# Patient Record
Sex: MALE | Race: WHITE | NOT HISPANIC OR LATINO | Employment: FULL TIME | URBAN - METROPOLITAN AREA
[De-identification: names, ages, dates, MRNs, and addresses within clinical notes are randomized per-mention and may not be internally consistent; named-entity substitution may affect disease eponyms.]

---

## 2018-03-24 ENCOUNTER — HISTORICAL (OUTPATIENT)
Dept: ADMINISTRATIVE | Facility: HOSPITAL | Age: 33
End: 2018-03-24

## 2023-01-30 ENCOUNTER — HOSPITAL ENCOUNTER (EMERGENCY)
Facility: HOSPITAL | Age: 38
Discharge: HOME | End: 2023-01-30
Attending: EMERGENCY MEDICINE
Payer: OTHER MISCELLANEOUS

## 2023-01-30 VITALS
RESPIRATION RATE: 18 BRPM | HEART RATE: 60 BPM | WEIGHT: 232 LBS | DIASTOLIC BLOOD PRESSURE: 57 MMHG | TEMPERATURE: 98 F | SYSTOLIC BLOOD PRESSURE: 119 MMHG | BODY MASS INDEX: 32.48 KG/M2 | OXYGEN SATURATION: 96 % | HEIGHT: 71 IN

## 2023-01-30 DIAGNOSIS — M25.519 SHOULDER PAIN: ICD-10-CM

## 2023-01-30 DIAGNOSIS — S46.011A TRAUMATIC TEAR OF RIGHT ROTATOR CUFF, UNSPECIFIED TEAR EXTENT, INITIAL ENCOUNTER: Primary | ICD-10-CM

## 2023-01-30 PROCEDURE — 63600175 PHARM REV CODE 636 W HCPCS: Performed by: NURSE PRACTITIONER

## 2023-01-30 PROCEDURE — 99284 EMERGENCY DEPT VISIT MOD MDM: CPT

## 2023-01-30 PROCEDURE — 96372 THER/PROPH/DIAG INJ SC/IM: CPT | Performed by: NURSE PRACTITIONER

## 2023-01-30 RX ORDER — KETOROLAC TROMETHAMINE 30 MG/ML
30 INJECTION, SOLUTION INTRAMUSCULAR; INTRAVENOUS
Status: COMPLETED | OUTPATIENT
Start: 2023-01-30 | End: 2023-01-30

## 2023-01-30 RX ORDER — DIAZEPAM 10 MG/2ML
5 INJECTION INTRAMUSCULAR
Status: COMPLETED | OUTPATIENT
Start: 2023-01-30 | End: 2023-01-30

## 2023-01-30 RX ORDER — NAPROXEN 500 MG/1
500 TABLET ORAL 2 TIMES DAILY WITH MEALS
Qty: 15 TABLET | Refills: 0 | Status: SHIPPED | OUTPATIENT
Start: 2023-01-30

## 2023-01-30 RX ADMIN — KETOROLAC TROMETHAMINE 30 MG: 30 INJECTION, SOLUTION INTRAMUSCULAR; INTRAVENOUS at 12:01

## 2023-01-30 RX ADMIN — DIAZEPAM 5 MG: 5 INJECTION, SOLUTION INTRAMUSCULAR; INTRAVENOUS at 12:01

## 2023-01-30 NOTE — DISCHARGE INSTRUCTIONS
RETURN TO EMERGENCY DEPARTMENT WITHOUT FAIL, IF YOUR SYMPTOMS WORSEN, IF YOU GET NEW OR DIFFERENT SYMPTOMS, IF YOU ARE UNABLE TO FOLLOW UP AS DIRECTED, OR IF YOU HAVE ANY CONCERNS OR WORRIES.

## 2023-01-30 NOTE — ED NOTES
"Pt in room. ALERT AND ORIENTED.  REPORTS WRENCHING INJURY AT R SHOULDER. SWELLING NOTED. <3" CAPILLARY REFILL X 4 EXTREMITIES. NO RD. JEWELL BUT DECREASED AND PAINFUL ROM AT RA.  ABLE TO MOVE FINGERS. NON DISTENDED ABDOMEN. FALLS RISK. CALL LIGHT IN REACH. ARM PROPPED FOR COMFORT.  ALERT AND ORIENTED.   "

## 2023-01-30 NOTE — FIRST PROVIDER EVALUATION
"Medical screening examination initiated.  I have conducted a focused provider triage encounter, findings are as follows:    Brief history of present illness:  Patient is a 28-year-old male presenting to ED for right shoulder pain.  Pt states his arm was thrown up and back by a malfunction with a high pressure hose.    Vitals:    01/30/23 1127   BP: (!) 146/76   Pulse: 70   Resp: 18   Temp: 98 °F (36.7 °C)   TempSrc: Oral   SpO2: 96%   Weight: 105.2 kg (232 lb)   Height: 5' 11" (1.803 m)       Pertinent physical exam:  VSS. Distressed due to pain.  Decreased ROM due to pain.     Brief workup plan:  imaging, medicate    Preliminary workup initiated; this workup will be continued and followed by the physician or advanced practice provider that is assigned to the patient when roomed.  "

## 2023-01-30 NOTE — ED PROVIDER NOTES
"Encounter Date: 1/30/2023       History     Chief Complaint   Patient presents with    Shoulder Injury     "I feel like my shoulder is out of place." R shoulder x1 hr. Pts arm was thrown up and back by a malfunction with a high pressure hose.     37-year-old male with past medical history of high blood pressure presents emergency department with right shoulder injury.  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  He was holding it with his right arm and his right arm got pushed backward and he heard 2 loud pops.  He said that he felt like his arm slipped out of place but then when he was trying to get out of the pool he put it back in and he said that he felt like it might a slip back out again but he is not sure.  He says he has pain in his right shoulder.  Pain with movement pain with range of motion.  Better at rest.  Has not taken anything for the pain prior to arrival.  This happened about an hour ago.    Review of patient's allergies indicates:  No Known Allergies  No past medical history on file.  No past surgical history on file.  No family history on file.     Review of Systems   Constitutional:  Negative for chills and fever.   HENT:  Negative for congestion and sore throat.    Respiratory:  Negative for shortness of breath.    Cardiovascular:  Negative for chest pain and palpitations.   Gastrointestinal:  Negative for abdominal pain, nausea and vomiting.   Genitourinary:  Negative for dysuria.   Musculoskeletal:  Positive for arthralgias. Negative for back pain.   Skin:  Negative for rash.   Neurological:  Negative for seizures, weakness and headaches.   Hematological:  Does not bruise/bleed easily.   All other systems reviewed and are negative.    Physical Exam     Initial Vitals [01/30/23 1127]   BP Pulse Resp Temp SpO2   (!) 146/76 70 18 98 °F (36.7 °C) 96 %      MAP       --         Physical Exam    Nursing note and vitals " reviewed.  Constitutional: He appears well-developed and well-nourished. He is not diaphoretic. No distress.   HENT:   Head: Normocephalic and atraumatic.   Mouth/Throat: Oropharynx is clear and moist. No oropharyngeal exudate.   Eyes: Conjunctivae and EOM are normal. Pupils are equal, round, and reactive to light.   Neck: Neck supple. No tracheal deviation present.   Normal range of motion.  Cardiovascular:  Normal rate, regular rhythm, normal heart sounds and intact distal pulses.           No murmur heard.  Pulmonary/Chest: Breath sounds normal. No stridor. No respiratory distress. He has no wheezes. He has no rales.   Abdominal: Abdomen is soft. Bowel sounds are normal. He exhibits no distension. There is no abdominal tenderness. There is no rebound and no guarding.   Musculoskeletal:         General: No tenderness or edema. Normal range of motion.      Cervical back: Normal range of motion and neck supple.      Comments: Right shoulder:  There is decreased range of motion secondary to pain.  There is no obvious palpable deformity.  There is a 2+ pulse present in the right radial artery.  There is no tenderness to the scapular region.  Full range of motion at the elbow without tenderness.     Neurological: He is alert and oriented to person, place, and time. He has normal strength. No cranial nerve deficit or sensory deficit.   Skin: Skin is warm and dry. Capillary refill takes less than 2 seconds. No rash noted. No erythema. No pallor.   Psychiatric: He has a normal mood and affect. His behavior is normal. Thought content normal.       ED Course   Procedures  Labs Reviewed - No data to display       Imaging Results              X-Ray Shoulder Trauma Right (Final result)  Result time 01/30/23 12:34:00      Final result by Talon Gonzalez MD (01/30/23 12:34:00)                   Narrative:    Reason: Shoulder pain. Possible dislocation.    FINDINGS:    Three views of the right shoulder show no fracture,  dislocation, or destructive osseous lesion. There is mild osteophytosis of the inferior glenoid. Mild degenerative changes of the AC joint.    IMPRESSION:    Mild degenerative changes of the right shoulder with no acute osseous abnormality.    Electronically signed by:  Talon Lisa DA SILVA  1/30/2023 12:34 PM [facility] Workstation: [id number]                                     Medications   diazePAM injection 5 mg (5 mg Intramuscular Given 1/30/23 2473)   ketorolac injection 30 mg (30 mg Intramuscular Given 1/30/23 1226)     Medical Decision Making:   Clinical Tests:   Radiological Study: Ordered and Reviewed  ED Management:  37-year-old male presents emergency department right shoulder injury.  I do not see any evidence of any dislocation or fracture.  He has some DJD shoulder.  He has been placed in a sling.  Pain is improved after Toradol.  He likely has a rotator cuff tear.  I do not see any other injuries on history or physical exam.  Recommend follow-up with Orthopedics sling use and return if symptoms change or worsen.  He is neurovascularly intact distally.      I had a detailed discussion with the patient and/or guardian regarding: The historical points, exam findings, and diagnostic results supporting the discharge diagnosis, lab results, pertinent radiology results, and the need for outpatient follow-up, for definitive care with a family practitioner and to return to the emergency department if symptoms worsen or persist or if there are any questions or concerns that arise at home. All questions have been answered in detail. Strict return to Emergency Department precautions have been provided.    A dictation software program was used for this note.  Please expect some simple typographical  errors in this note.                         Clinical Impression:   Final diagnoses:  [M25.519] Shoulder pain  [S46.011A] Traumatic tear of right rotator cuff, unspecified tear extent, initial encounter (Primary)        ED  Disposition Condition    Discharge Stable          ED Prescriptions       Medication Sig Dispense Start Date End Date Auth. Provider    naproxen (NAPROSYN) 500 MG tablet Take 1 tablet (500 mg total) by mouth 2 (two) times daily with meals. 15 tablet 1/30/2023 -- Kane Vora DO          Follow-up Information       Follow up With Specialties Details Why Contact Info Additional Information    [facility] - Emergency Dept Emergency Medicine  If symptoms worsen [address] [facility] [id number] [phone] 1st floor    Hector Almaraz MD Orthopedic Surgery In 3 days  [address] [place] [address]  [phone]                Kane Vora DO  01/30/23 1241

## 2023-01-30 NOTE — ED NOTES
Sling and swathe in place at R shoulder.  Alert and oriented.  Sensation intact. Painful ROM continues.

## 2023-02-24 ENCOUNTER — HOSPITAL ENCOUNTER (OUTPATIENT)
Dept: RADIOLOGY | Facility: HOSPITAL | Age: 38
Discharge: HOME | End: 2023-02-24
Attending: ORTHOPAEDIC SURGERY

## 2023-02-24 ENCOUNTER — HOSPITAL ENCOUNTER (EMERGENCY)
Facility: HOSPITAL | Age: 38
Discharge: HOME | End: 2023-02-24
Attending: EMERGENCY MEDICINE

## 2023-02-24 VITALS
OXYGEN SATURATION: 100 % | BODY MASS INDEX: 33.6 KG/M2 | DIASTOLIC BLOOD PRESSURE: 82 MMHG | SYSTOLIC BLOOD PRESSURE: 176 MMHG | RESPIRATION RATE: 18 BRPM | HEART RATE: 88 BPM | WEIGHT: 240 LBS | HEIGHT: 71 IN | TEMPERATURE: 99 F

## 2023-02-24 DIAGNOSIS — R60.0 LOCALIZED EDEMA: Primary | ICD-10-CM

## 2023-02-24 DIAGNOSIS — Z76.0 MEDICATION REFILL: Primary | ICD-10-CM

## 2023-02-24 DIAGNOSIS — R60.0 LOCALIZED EDEMA: ICD-10-CM

## 2023-02-24 DIAGNOSIS — I82.621 ACUTE DEEP VEIN THROMBOSIS (DVT) OF RIGHT UPPER EXTREMITY, UNSPECIFIED VEIN: ICD-10-CM

## 2023-02-24 PROCEDURE — 93971 EXTREMITY STUDY: CPT | Mod: TC,RT

## 2023-02-24 PROCEDURE — 99282 EMERGENCY DEPT VISIT SF MDM: CPT | Mod: 25

## 2023-02-25 NOTE — DISCHARGE INSTRUCTIONS
Please take your blood thinner as prescribed.  Do not take any nonsteroidal anti-inflammatory drugs while taking this medication.  You must follow up with her primary care provider and orthopedic doctor for further management and rechecks.  Please return to the ED for any chest pain or shortness of breath, worsening arm pain, fever, headaches, changes in vision or any new or worsening symptoms.

## 2023-02-25 NOTE — ED PROVIDER NOTES
Encounter Date: 2/24/2023       History     Chief Complaint   Patient presents with    Medication Refill     Pt positive for blood clots in R arm after surgery on r arm.  Pt confirmed clots via ultrasound today.  Pt unable to fill Eliquis prescription     Patient is a 38 y.o. male with past medical history of hypertension and venous thromboembolism who presents to ED via self for concern for medication correction.  Patient states he had shoulder surgery last Friday and yesterday noticed his arm was swelling more and more painful today.  Patient states he was orthopedic doctor wrote for him to have an ultrasound which he had performed today.  The ultrasound was read as an extensive DVT in the upper extremity.  Patient reports his orthopedic doctor called in Eliquis to the [facility] and when he went to go pick it up the pharmacist told him the dosing was incorrect.  The pharmacist recommended the patient come to the ED so a provider could rewrite his Eliquis prescription correctly.  Patient denies any chest pain or shortness of breath, headaches, change in vision, or any new worsening or new symptoms.  Patient is awake and alert in no acute distress.    Review of patient's allergies indicates:  No Known Allergies  No past medical history on file.  No past surgical history on file.  No family history on file.     Review of Systems   Constitutional:  Negative for fever.   Eyes:  Negative for visual disturbance.   Respiratory:  Negative for cough and shortness of breath.    Cardiovascular:  Negative for chest pain and leg swelling.   Musculoskeletal:  Negative for back pain.        Right arm swelling   Skin:  Negative for rash.   Neurological:  Negative for weakness and headaches.   Hematological:  Does not bruise/bleed easily.     Physical Exam     Initial Vitals [02/24/23 2052]   BP Pulse Resp Temp SpO2   (!) 187/86 85 20 98.3 °F (36.8 °C) 100 %      MAP       --         Physical Exam    Nursing note and vitals  reviewed.  Constitutional: He appears well-developed and well-nourished. He is not diaphoretic. No distress.   HENT:   Head: Normocephalic and atraumatic.   Right Ear: External ear normal.   Left Ear: External ear normal.   Eyes: EOM are normal.   Neck:   Normal range of motion.  Cardiovascular:  Normal rate, regular rhythm, normal heart sounds and intact distal pulses.     Exam reveals no gallop and no friction rub.       No murmur heard.  Pulmonary/Chest: Breath sounds normal. No respiratory distress. He has no wheezes. He has no rhonchi. He has no rales. He exhibits no tenderness.   Musculoskeletal:         General: Normal range of motion.      Right forearm: Swelling and edema present.      Right wrist: Swelling present. Normal pulse.      Right hand: Swelling present. Normal capillary refill.      Cervical back: Normal range of motion.     Neurological: He is alert and oriented to person, place, and time. He has normal strength. GCS score is 15. GCS eye subscore is 4. GCS verbal subscore is 5. GCS motor subscore is 6.   Skin: Skin is warm and dry. Capillary refill takes less than 2 seconds.   Psychiatric: He has a normal mood and affect. His behavior is normal. Judgment and thought content normal.       ED Course   Procedures  Labs Reviewed - No data to display       Imaging Results    None          Medications - No data to display  Medical Decision Making:   Initial Assessment:   Patient is a 38 y.o. male with past medical history of hypertension and venous thromboembolism who presents to ED via self for concern for medication correction.  Patient states he had shoulder surgery last Friday and yesterday noticed his arm was swelling more and more painful today.  Patient states he was orthopedic doctor wrote for him to have an ultrasound which he had performed today.  The ultrasound was read as an extensive DVT in the upper extremity.  Patient reports his orthopedic doctor called in Eliquis to the pharmacy  tonight and when he went to go pick it up the pharmacist told him the dosing was incorrect.  The pharmacist recommended the patient come to the ED so a provider could rewrite his Eliquis prescription correctly.  Patient denies any chest pain or shortness of breath, headaches, change in vision, or any new worsening or new symptoms.  Patient is awake and alert in no acute distress.    ED Management:  [facility]    Patient presents for emergent evaluation of acute right arm swelling and pain that poses a possible threat to life and/or bodily function.    In the ED patient found to have acute blood clots diagnosed on ultrasound at 6:00 p.m. today.  Patient's right arm is significantly swollen, patient has good radial pulse and cap refill distally.  Patient has no shortness of breath or chest pain.  Patient's orthopedic doctor called in Eliquis prescription that was dose incorrectly according to the pharmacist.  Patient presents to the ED for correct dosing of Eliquis so he can start his prescription.  I reviewed ultrasound results from patient's records which confirms the blood clot.      Discharge [facility]  I discussed the patient presentation, US, and medication with my attending Dr. Kim.  I called and spoke to the [facility] pharmacist and confirmed the correct dosing of 10 mg twice daily for 7 days followed by 5 mg twice a day.  I personally called and spoke to the pharmacist at [facility] pharmacy on [facility].  I confirm that patient's previous Eliquis prescription was incorrect dosage.  I discussed the correct dosage with the pharmacist who agreed that that was the correct dosage for the patient's condition.  I electronically wrote and Eliquis prescription and sent it to the pharmacy.  I called back the pharmacist and spoke to him that he did get the correct prescription and was able to fill it for the patient.  Patient was managed in the ED with evaluation and re-evaluation.    The response to treatment was good.    Patient was  discharged in stable condition with close follow up with primary care provider and orthopedics.  Detailed return precautions discussed to return to the ED for any chest pain, shortness of breath, difficulty breathing, fever, worsening arm pain, headaches, changes in vision, or any new or worsening symptoms.  Discussed with patient the importance of taking Eliquis as prescribed as well as following up with primary care provider for further refills and evaluation of his blood clot.  Patient given multiple handouts on taking a blood thinner.  Patient states understanding.                        Clinical Impression:   Final diagnoses:  [Z76.0] Medication refill (Primary)  [I82.621] Acute deep vein thrombosis (DVT) of right upper extremity, unspecified vein        ED Disposition Condition    Discharge Stable          ED Prescriptions       Medication Sig Dispense Start Date End Date Auth. Provider    apixaban (ELIQUIS) 5 mg Tab Take 2 tablets (10 mg total) by mouth 2 (two) times daily for 7 days, THEN 1 tablet (5 mg total) 2 (two) times daily for 21 days. 70 tablet 2/24/2023 3/24/2023 Margaret Gaston NP          Follow-up Information       Follow up With Specialties Details Why Contact Info Additional Information    [facility] - Emergency Dept Emergency Medicine  If symptoms worsen [address]  [facility] [address]  [phone] 1st floor             Margaret Gaston NP  02/25/23 0151

## 2023-03-01 ENCOUNTER — PATIENT OUTREACH (OUTPATIENT)
Dept: EMERGENCY MEDICINE | Facility: HOSPITAL | Age: 38
End: 2023-03-01

## 2023-03-01 NOTE — PROGRESS NOTES
Betina Spencer  ED Navigator  Emergency Department    Project: [facility] ED Navigator  Role: Community Health Worker    Date: 03/01/2023  Patient Name: Antoine Pearson  MRN: [id number]  PCP: Andie Mcarthur NP    Assessment:     Antoine Pearson is a 38 y.o. male who has presented to ED for Medication Refill. Patient has visited the ED 2 times in the past 3 months. Patient did not contact PCP.     ED Navigator Initial Assessment    ED Navigator Enrollment Documentation  Consent to Services  Does patient consent to completing the assessment?: Yes  Contact  Method of Initial Contact: Phone  Transportation  Does the patient have issues with Transportation?: No  Does the patient have transportation to and from healthcare appointments?: Yes  Insurance Coverage  Do you have coverage/adequate coverage?: No  Reason for no/inadequate coverage: Uninsured  Specialist Appointment  Did the patient come to the ED to see a specialist?: No  Does the patient have a pending specialist referral?: No  Does the patient have a specialist appointment made?: No  PCP Follow Up Appointment  Has the patient had an appointment with a primary care provider in the past year?: No  Does the patient have a follow up appontment with a PCP?: No  When was the last time you saw your PCP?: 3/1/22  Why does the patient not have a follow up scheduled?: Other (see comments)  Medications  Is patient able to afford medication?: Yes  Is patient unable to get medication due to lack of transportation?: No  Psychological  Does the patient have psycho-social concerns?: No  Food  Does the patient have concerns about food?: No  Communication/Education  Does the patient have limited English proficiency/English not primary language?: No  Does patient have low literacy and/or low health literacy?: Yes  Does patient have concerns with care?: No  Does patient have dissatisfaction with care?: No  Other Financial Concerns  Does the patient have immediate financial distress?:  No  Does the patient have general financial concerns?: No  Other Social Barriers/Concerns  Does the patient have any additional barriers or concerns?: None  Primary Barrier  Barriers identified: Cognitive barrier (health literacy, language and communication, etc.)  Root Cause of ED Utilization: Patient Knowledge/Low Health Literacy  Plan to address Patient Knowledge/Low Health Literacy: Provided information for [facility] 24/7 Nurse triage line [phone] or 1-866-[facility] ([phone])  Next steps: Provided Education  Was education/educational materials provided surrounding PCP services/creating a medical home?: Yes Was education verbal or written?: Written     Was education/educational materials provided surrounding low cost, healthy foods?: Yes Was education verbal or written?: Written     Was education/educational materials provided surrounding other items? If so, use comment to explain.: Yes Was education verbal or written?: Written   Plan: Provided information for [facility] 24[facility]7 Nurse triage line, [phone] or 1-866-[facility] ([phone])  Expected Date of Follow Up 1: 4/12/23         Social History     Socioeconomic History    Marital status:    Tobacco Use    Smoking status: Former     Types: Cigarettes     Social Determinants of Health     Financial Resource Strain: Low Risk     Difficulty of Paying Living Expenses: Not hard at all   Food Insecurity: No Food Insecurity    Worried About Running Out of Food in the Last Year: Never true    Ran Out of Food in the Last Year: Never true   Transportation Needs: No Transportation Needs    Lack of Transportation (Medical): No    Lack of Transportation (Non-Medical): No   Stress: No Stress Concern Present    Feeling of Stress : Not at all   Social Connections: Unknown    Frequency of Communication with Friends and Family: Three times a week    Frequency of Social Gatherings with Friends and Family: Three times a week    Marital Status:     Housing Stability: Unknown    Unable to Pay for Housing in the Last Year: No    Unstable Housing in the Last Year: No       Plan:   Spoke with patient on the telephone and completed initial enrollment.  Patient reported no concerns with food, housing, utilities, or transportation.  Patient stated he has a PCP but does not have a follow up appointment and declined needing assistance in scheduling one.  Patient stated did see his surgeon on Monday for a follow up.  Patient denied needing help establishing other providers.  Patient does not have personal insurance and stated he makes too much money to be approved for Medicaid.  Patient's current medical bills are related to a work injury and are covered under Worker's Compensation.  Patient stated he quit smoking when he had his surgery last month.  Patient reported no concerns with alcohol use or depression/anxiety.  Patient was given education on ([facility] information, [facility] information, and Heart healthy diet tips), [facility] Nurse Triage line, [facility] Smoking Cessation, [facility] Financial assistance, and information on [facility] providers.    Betina Spencer  ED Navigator

## 2023-03-03 ENCOUNTER — OFFICE VISIT (OUTPATIENT)
Dept: HEMATOLOGY/ONCOLOGY | Facility: CLINIC | Age: 38
End: 2023-03-03

## 2023-03-03 ENCOUNTER — LAB VISIT (OUTPATIENT)
Dept: LAB | Facility: HOSPITAL | Age: 38
End: 2023-03-03
Attending: INTERNAL MEDICINE

## 2023-03-03 VITALS
WEIGHT: 244.5 LBS | TEMPERATURE: 98 F | DIASTOLIC BLOOD PRESSURE: 60 MMHG | RESPIRATION RATE: 12 BRPM | SYSTOLIC BLOOD PRESSURE: 117 MMHG | HEART RATE: 60 BPM | BODY MASS INDEX: 33.12 KG/M2 | HEIGHT: 72 IN | OXYGEN SATURATION: 95 %

## 2023-03-03 DIAGNOSIS — I82.A11 ACUTE DEEP VEIN THROMBOSIS (DVT) OF AXILLARY VEIN OF RIGHT UPPER EXTREMITY: Primary | ICD-10-CM

## 2023-03-03 DIAGNOSIS — I82.A11 ACUTE DEEP VEIN THROMBOSIS (DVT) OF AXILLARY VEIN OF RIGHT UPPER EXTREMITY: ICD-10-CM

## 2023-03-03 PROCEDURE — 86146 BETA-2 GLYCOPROTEIN ANTIBODY: CPT | Mod: 59 | Performed by: INTERNAL MEDICINE

## 2023-03-03 PROCEDURE — 99204 PR OFFICE/OUTPT VISIT, NEW, LEVL IV, 45-59 MIN: ICD-10-PCS | Mod: S$PBB,,, | Performed by: INTERNAL MEDICINE

## 2023-03-03 PROCEDURE — 99999 PR PBB SHADOW E&M-EST. PATIENT-LVL III: CPT | Mod: PBBFAC,,, | Performed by: INTERNAL MEDICINE

## 2023-03-03 PROCEDURE — 99204 OFFICE O/P NEW MOD 45 MIN: CPT | Mod: S$PBB,,, | Performed by: INTERNAL MEDICINE

## 2023-03-03 PROCEDURE — 81241 F5 GENE: CPT | Performed by: INTERNAL MEDICINE

## 2023-03-03 PROCEDURE — 85613 RUSSELL VIPER VENOM DILUTED: CPT | Mod: 91 | Performed by: INTERNAL MEDICINE

## 2023-03-03 PROCEDURE — 85300 ANTITHROMBIN III ACTIVITY: CPT | Performed by: INTERNAL MEDICINE

## 2023-03-03 PROCEDURE — 99213 OFFICE O/P EST LOW 20 MIN: CPT | Mod: PBBFAC,PO | Performed by: INTERNAL MEDICINE

## 2023-03-03 PROCEDURE — 86147 CARDIOLIPIN ANTIBODY EA IG: CPT | Mod: 59 | Performed by: INTERNAL MEDICINE

## 2023-03-03 PROCEDURE — 36415 COLL VENOUS BLD VENIPUNCTURE: CPT | Performed by: INTERNAL MEDICINE

## 2023-03-03 PROCEDURE — 85303 CLOT INHIBIT PROT C ACTIVITY: CPT | Performed by: INTERNAL MEDICINE

## 2023-03-03 PROCEDURE — 81240 F2 GENE: CPT | Performed by: INTERNAL MEDICINE

## 2023-03-03 PROCEDURE — 85306 CLOT INHIBIT PROT S FREE: CPT | Performed by: INTERNAL MEDICINE

## 2023-03-03 PROCEDURE — 99999 PR PBB SHADOW E&M-EST. PATIENT-LVL III: ICD-10-PCS | Mod: PBBFAC,,, | Performed by: INTERNAL MEDICINE

## 2023-03-03 RX ORDER — OXYCODONE AND ACETAMINOPHEN 5; 325 MG/1; MG/1
1 TABLET ORAL EVERY 8 HOURS PRN
COMMUNITY
Start: 2023-02-24

## 2023-03-03 NOTE — PROGRESS NOTES
"Service Date:  3/3/23    Chief Complaint: RUE DVT    Antoine Pearson is a 38 y.o. male referred to me for right upper extremity DVT.  Patient has an extensive DVT in his right arm.  He had shoulder surgery done a week prior to the DVT developing.  He has been on Eliquis for the past 6 days.  He denies any family history of DVT.  Does have tobacco use, but quit right when he was diagnosed with a DVT.  He denies any chest pain or shortness of breath    Review of Systems   Constitutional: Negative.    HENT: Negative.     Eyes: Negative.    Respiratory: Negative.     Cardiovascular: Negative.    Gastrointestinal: Negative.    Endocrine: Negative.    Genitourinary: Negative.    Musculoskeletal: Negative.    Integumentary:  Negative.   Neurological: Negative.    Hematological: Negative.    Psychiatric/Behavioral: Negative.        Current Outpatient Medications   Medication Instructions    apixaban (ELIQUIS) 5 mg Tab Take 2 tablets (10 mg total) by mouth 2 (two) times daily for 7 days, THEN 1 tablet (5 mg total) 2 (two) times daily for 21 days.    naproxen (NAPROSYN) 500 mg, Oral, 2 times daily with meals    oxyCODONE-acetaminophen (PERCOCET) 5-325 mg per tablet 1 tablet, Oral, Every 8 hours PRN        History reviewed. No pertinent past medical history.     History reviewed. No pertinent surgical history.     History reviewed. No pertinent family history.    Social History     Tobacco Use    Smoking status: Former     Types: Cigarettes         Vitals:    03/03/23 0855   BP: 117/60   Pulse: 60   Resp: 12   Temp: 97.6 °F (36.4 °C)        Physical Exam:  /60 (BP Location: Left arm, Patient Position: Sitting, BP Method: Large (Automatic))   Pulse 60   Temp 97.6 °F (36.4 °C) (Temporal)   Resp 12   Ht 5' 11.5" (1.816 m)   Wt 110.9 kg (244 lb 7.8 oz)   SpO2 95%   BMI 33.62 kg/m²     Physical Exam  Vitals and nursing note reviewed.   Constitutional:       Appearance: Normal appearance.   HENT:      Head: " Normocephalic and atraumatic.      Nose: Nose normal.      Mouth/Throat:      Mouth: Mucous membranes are moist.      Pharynx: Oropharynx is clear.   Eyes:      Extraocular Movements: Extraocular movements intact.      Conjunctiva/sclera: Conjunctivae normal.   Cardiovascular:      Rate and Rhythm: Normal rate and regular rhythm.      Heart sounds: Normal heart sounds.   Pulmonary:      Effort: Pulmonary effort is normal.      Breath sounds: Normal breath sounds.   Abdominal:      General: Abdomen is flat. Bowel sounds are normal.      Palpations: Abdomen is soft.   Musculoskeletal:         General: Normal range of motion.      Cervical back: Normal range of motion and neck supple.   Skin:     General: Skin is warm and dry.   Neurological:      General: No focal deficit present.      Mental Status: He is alert and oriented to person, place, and time. Mental status is at baseline.   Psychiatric:         Mood and Affect: Mood normal.        Labs:  No results found for: WBC, RBC, HGB, HCT, MCV, MCH, MCHC, RDW, PLT, MPV, GRAN, LYMPH, MONO, EOS, BASO, EOSINOPHIL, BASOPHIL  No results found for: NA, K, CL, CO2, GLU, BUN, CREATININE, CALCIUM, PROT, ALBUMIN, BILITOT, ALKPHOS, AST, ALT, ANIONGAP, ESTGFRAFRICA, EGFRNONAA    A/P:    Right upper extremity DVT  -likely provoked from his shoulder surgery   -will get hypercoagulable workup and have him return in 2 weeks to discuss.  -may only need 3 months of anticoagulation depending on those results.      Aurash Khoobehi, MD  Hematology and Oncology

## 2023-03-05 LAB
AT III ACT/NOR PPP CHRO: 169 % (ref 75–135)
B2 GLYCOPROT1 IGA SER-ACNC: <9 GPI IGA UNITS (ref 0–25)
B2 GLYCOPROT1 IGG SER-ACNC: <9 GPI IGG UNITS (ref 0–20)
B2 GLYCOPROT1 IGM SER-ACNC: <9 GPI IGM UNITS (ref 0–32)
CARDIOLIPIN IGA SER IA-ACNC: <9 MPL U/ML (ref 0–12)
CARDIOLIPIN IGG SER IA-ACNC: <9 GPL U/ML (ref 0–14)
CARDIOLIPIN IGM SER IA-ACNC: <9 APL U/ML (ref 0–11)
PROT C ACT/NOR PPP: 119 % (ref 73–180)
PROT S ACT/NOR PPP: 119 % (ref 63–140)

## 2023-03-07 LAB
F2 GENE MUT ANL BLD/T: NORMAL
F5 GENE MUT ANL BLD/T: NORMAL

## 2023-03-14 LAB
CONFIRM DRVVT: 52.4 SEC
SCREEN DRVVT: 63.2 SEC

## 2023-03-16 ENCOUNTER — PATIENT MESSAGE (OUTPATIENT)
Dept: HEMATOLOGY/ONCOLOGY | Facility: CLINIC | Age: 38
End: 2023-03-16

## 2023-03-17 ENCOUNTER — OFFICE VISIT (OUTPATIENT)
Dept: HEMATOLOGY/ONCOLOGY | Facility: CLINIC | Age: 38
End: 2023-03-17

## 2023-03-17 VITALS
HEART RATE: 59 BPM | RESPIRATION RATE: 12 BRPM | OXYGEN SATURATION: 97 % | HEIGHT: 71 IN | TEMPERATURE: 98 F | BODY MASS INDEX: 34.57 KG/M2 | DIASTOLIC BLOOD PRESSURE: 62 MMHG | WEIGHT: 246.94 LBS | SYSTOLIC BLOOD PRESSURE: 124 MMHG

## 2023-03-17 DIAGNOSIS — I82.A11 ACUTE DEEP VEIN THROMBOSIS (DVT) OF AXILLARY VEIN OF RIGHT UPPER EXTREMITY: Primary | ICD-10-CM

## 2023-03-17 PROCEDURE — 99214 OFFICE O/P EST MOD 30 MIN: CPT | Mod: S$PBB,,, | Performed by: INTERNAL MEDICINE

## 2023-03-17 PROCEDURE — 99214 PR OFFICE/OUTPT VISIT, EST, LEVL IV, 30-39 MIN: ICD-10-PCS | Mod: S$PBB,,, | Performed by: INTERNAL MEDICINE

## 2023-03-17 PROCEDURE — 99214 OFFICE O/P EST MOD 30 MIN: CPT | Mod: PBBFAC,PO | Performed by: INTERNAL MEDICINE

## 2023-03-17 PROCEDURE — 99999 PR PBB SHADOW E&M-EST. PATIENT-LVL IV: ICD-10-PCS | Mod: PBBFAC,,, | Performed by: INTERNAL MEDICINE

## 2023-03-17 PROCEDURE — 99999 PR PBB SHADOW E&M-EST. PATIENT-LVL IV: CPT | Mod: PBBFAC,,, | Performed by: INTERNAL MEDICINE

## 2023-03-17 NOTE — PROGRESS NOTES
"Service Date:  3/17/23    Chief Complaint: RUE DVT    Antoine Pearson is a 38 y.o. male here to follow-up on hypercoagulable workup for right upper extremity DVT.  Blood work shows an elevated DRVVT, but this could be due to patient being on anticoagulation.  Patient states his arm is feeling better and the swelling has decreased.    Review of Systems   Constitutional: Negative.    HENT: Negative.     Eyes: Negative.    Respiratory: Negative.     Cardiovascular: Negative.    Gastrointestinal: Negative.    Endocrine: Negative.    Genitourinary: Negative.    Musculoskeletal: Negative.    Integumentary:  Negative.   Neurological: Negative.    Hematological: Negative.    Psychiatric/Behavioral: Negative.        Current Outpatient Medications   Medication Instructions    apixaban (ELIQUIS) 5 mg Tab Take 2 tablets (10 mg total) by mouth 2 (two) times daily for 7 days, THEN 1 tablet (5 mg total) 2 (two) times daily for 21 days.    apixaban (ELIQUIS) 5 mg, Oral, 2 times daily    naproxen (NAPROSYN) 500 mg, Oral, 2 times daily with meals    oxyCODONE-acetaminophen (PERCOCET) 5-325 mg per tablet 1 tablet, Oral, Every 8 hours PRN        History reviewed. No pertinent past medical history.     No past surgical history on file.     History reviewed. No pertinent family history.    Social History     Tobacco Use    Smoking status: Former     Types: Cigarettes         Vitals:    03/17/23 1101   BP: 124/62   Pulse: (!) 59   Resp: 12   Temp: 97.5 °F (36.4 °C)        Physical Exam:  /62 (BP Location: Left arm, Patient Position: Sitting, BP Method: Large (Automatic))   Pulse (!) 59   Temp 97.5 °F (36.4 °C) (Temporal)   Resp 12   Ht 5' 11" (1.803 m)   Wt 112 kg (246 lb 14.6 oz)   SpO2 97%   BMI 34.44 kg/m²     Physical Exam  Vitals and nursing note reviewed.   Constitutional:       Appearance: Normal appearance.   HENT:      Head: Normocephalic and atraumatic.      Nose: Nose normal.      Mouth/Throat:      Mouth: Mucous " membranes are moist.      Pharynx: Oropharynx is clear.   Eyes:      Extraocular Movements: Extraocular movements intact.      Conjunctiva/sclera: Conjunctivae normal.   Cardiovascular:      Rate and Rhythm: Normal rate and regular rhythm.      Heart sounds: Normal heart sounds.   Pulmonary:      Effort: Pulmonary effort is normal.      Breath sounds: Normal breath sounds.   Abdominal:      General: Abdomen is flat. Bowel sounds are normal.      Palpations: Abdomen is soft.   Musculoskeletal:         General: Normal range of motion.      Cervical back: Normal range of motion and neck supple.   Skin:     General: Skin is warm and dry.   Neurological:      General: No focal deficit present.      Mental Status: He is alert and oriented to person, place, and time. Mental status is at baseline.   Psychiatric:         Mood and Affect: Mood normal.        Labs:  No results found for: WBC, RBC, HGB, HCT, MCV, MCH, MCHC, RDW, PLT, MPV, GRAN, LYMPH, MONO, EOS, BASO, EOSINOPHIL, BASOPHIL  No results found for: NA, K, CL, CO2, GLU, BUN, CREATININE, CALCIUM, PROT, ALBUMIN, BILITOT, ALKPHOS, AST, ALT, ANIONGAP, ESTGFRAFRICA, EGFRNONAA    A/P:    Right upper extremity DVT  -likely provoked from his shoulder surgery   -hypercoagulable workup negative except for DVT RRT.  I will repeat this again in 12 weeks.  I will see him back in 9 weeks which will have completed 3 months and instructed him further what to do from there.      Aurash Khoobehi, MD  Hematology and Oncology

## 2023-05-14 DIAGNOSIS — I82.A11 ACUTE DEEP VEIN THROMBOSIS (DVT) OF AXILLARY VEIN OF RIGHT UPPER EXTREMITY: ICD-10-CM

## 2023-05-15 RX ORDER — APIXABAN 5 MG/1
TABLET, FILM COATED ORAL
Qty: 60 TABLET | Refills: 1 | Status: SHIPPED | OUTPATIENT
Start: 2023-05-15 | End: 2023-05-23 | Stop reason: SDUPTHER

## 2023-05-19 ENCOUNTER — OFFICE VISIT (OUTPATIENT)
Dept: HEMATOLOGY/ONCOLOGY | Facility: CLINIC | Age: 38
End: 2023-05-19

## 2023-05-19 VITALS
HEART RATE: 60 BPM | RESPIRATION RATE: 12 BRPM | SYSTOLIC BLOOD PRESSURE: 117 MMHG | HEIGHT: 71 IN | WEIGHT: 251.56 LBS | BODY MASS INDEX: 35.22 KG/M2 | OXYGEN SATURATION: 96 % | TEMPERATURE: 97 F | DIASTOLIC BLOOD PRESSURE: 68 MMHG

## 2023-05-19 DIAGNOSIS — I82.A11 ACUTE DEEP VEIN THROMBOSIS (DVT) OF AXILLARY VEIN OF RIGHT UPPER EXTREMITY: Primary | ICD-10-CM

## 2023-05-19 PROCEDURE — 99999 PR PBB SHADOW E&M-EST. PATIENT-LVL IV: ICD-10-PCS | Mod: PBBFAC,,, | Performed by: INTERNAL MEDICINE

## 2023-05-19 PROCEDURE — 99214 OFFICE O/P EST MOD 30 MIN: CPT | Mod: PBBFAC,PN | Performed by: INTERNAL MEDICINE

## 2023-05-19 PROCEDURE — 99999 PR PBB SHADOW E&M-EST. PATIENT-LVL IV: CPT | Mod: PBBFAC,,, | Performed by: INTERNAL MEDICINE

## 2023-05-19 PROCEDURE — 99214 OFFICE O/P EST MOD 30 MIN: CPT | Mod: S$PBB,,, | Performed by: INTERNAL MEDICINE

## 2023-05-19 PROCEDURE — 99214 PR OFFICE/OUTPT VISIT, EST, LEVL IV, 30-39 MIN: ICD-10-PCS | Mod: S$PBB,,, | Performed by: INTERNAL MEDICINE

## 2023-05-19 RX ORDER — LISINOPRIL 40 MG/1
40 TABLET ORAL
COMMUNITY
Start: 2023-04-17

## 2023-05-19 RX ORDER — METOPROLOL TARTRATE 100 MG/1
100 TABLET ORAL 2 TIMES DAILY
COMMUNITY
Start: 2023-04-17

## 2023-05-19 RX ORDER — AMLODIPINE BESYLATE 10 MG/1
10 TABLET ORAL
COMMUNITY
Start: 2023-04-02

## 2023-05-19 NOTE — PROGRESS NOTES
"Service Date:  5/19/23    Chief Complaint: RUE DVT    Antoine Pearson is a 38 y.o. male here to follow-up on hypercoagulable workup for right upper extremity DVT.  Has completed 3 months of anticoagulation since yesterday.  Right arm still having some swelling issues but denies any pain.    Review of Systems   Constitutional: Negative.    HENT: Negative.     Eyes: Negative.    Respiratory: Negative.     Cardiovascular: Negative.    Gastrointestinal: Negative.    Endocrine: Negative.    Genitourinary: Negative.    Musculoskeletal: Negative.    Integumentary:  Negative.   Neurological: Negative.    Hematological: Negative.    Psychiatric/Behavioral: Negative.        Current Outpatient Medications   Medication Instructions    amLODIPine (NORVASC) 10 mg, Oral    ELIQUIS 5 mg Tab TAKE 1 TABLET BY MOUTH TWICE A DAY    lisinopriL (PRINIVIL,ZESTRIL) 40 mg, Oral    metoprolol tartrate (LOPRESSOR) 100 mg, Oral, 2 times daily    naproxen (NAPROSYN) 500 mg, Oral, 2 times daily with meals    oxyCODONE-acetaminophen (PERCOCET) 5-325 mg per tablet 1 tablet, Oral, Every 8 hours PRN        History reviewed. No pertinent past medical history.     History reviewed. No pertinent surgical history.     History reviewed. No pertinent family history.    Social History     Tobacco Use    Smoking status: Former     Types: Cigarettes    Smokeless tobacco: Current     Types: Chew         Vitals:    05/19/23 1058   BP: 117/68   Pulse: 60   Resp: 12   Temp: 97.3 °F (36.3 °C)        Physical Exam:  /68 (BP Location: Left arm, Patient Position: Sitting, BP Method: Large (Automatic))   Pulse 60   Temp 97.3 °F (36.3 °C) (Temporal)   Resp 12   Ht 5' 11" (1.803 m)   Wt 114.1 kg (251 lb 8.7 oz)   SpO2 96%   BMI 35.08 kg/m²     Physical Exam  Vitals and nursing note reviewed.   Constitutional:       Appearance: Normal appearance.   HENT:      Head: Normocephalic and atraumatic.      Nose: Nose normal.      Mouth/Throat:      Mouth: Mucous " membranes are moist.      Pharynx: Oropharynx is clear.   Eyes:      Extraocular Movements: Extraocular movements intact.      Conjunctiva/sclera: Conjunctivae normal.   Cardiovascular:      Rate and Rhythm: Normal rate and regular rhythm.      Heart sounds: Normal heart sounds.   Pulmonary:      Effort: Pulmonary effort is normal.      Breath sounds: Normal breath sounds.   Abdominal:      General: Abdomen is flat. Bowel sounds are normal.      Palpations: Abdomen is soft.   Musculoskeletal:         General: Normal range of motion.      Cervical back: Normal range of motion and neck supple.   Skin:     General: Skin is warm and dry.   Neurological:      General: No focal deficit present.      Mental Status: He is alert and oriented to person, place, and time. Mental status is at baseline.   Psychiatric:         Mood and Affect: Mood normal.        Labs:  No results found for: WBC, RBC, HGB, HCT, MCV, MCH, MCHC, RDW, PLT, MPV, GRAN, LYMPH, MONO, EOS, BASO, EOSINOPHIL, BASOPHIL  No results found for: NA, K, CL, CO2, GLU, BUN, CREATININE, CALCIUM, PROT, ALBUMIN, BILITOT, ALKPHOS, AST, ALT, ANIONGAP, ESTGFRAFRICA, EGFRNONAA    A/P:    Right upper extremity DVT  -likely provoked from his shoulder surgery   -repeat ultrasound of right arm  -depending on repeat ultrasound results will order a D-dimer in 1 month along with DVRRT.  -will call patient with US results      Aurash Khoobehi, MD  Hematology and Oncology

## 2023-05-22 ENCOUNTER — HOSPITAL ENCOUNTER (OUTPATIENT)
Dept: RADIOLOGY | Facility: HOSPITAL | Age: 38
Discharge: HOME | End: 2023-05-22
Attending: INTERNAL MEDICINE

## 2023-05-22 DIAGNOSIS — I82.A11 ACUTE DEEP VEIN THROMBOSIS (DVT) OF AXILLARY VEIN OF RIGHT UPPER EXTREMITY: ICD-10-CM

## 2023-05-22 PROCEDURE — 93971 EXTREMITY STUDY: CPT | Mod: TC,RT

## 2023-05-23 ENCOUNTER — TELEPHONE (OUTPATIENT)
Dept: HEMATOLOGY/ONCOLOGY | Facility: CLINIC | Age: 38
End: 2023-05-23

## 2023-05-23 DIAGNOSIS — I82.A11 ACUTE DEEP VEIN THROMBOSIS (DVT) OF AXILLARY VEIN OF RIGHT UPPER EXTREMITY: ICD-10-CM

## 2023-05-23 NOTE — TELEPHONE ENCOUNTER
Called pt to notify that f/u has been scheduled. Pt v/u.    ----- Message from Yadira Renee sent at 5/23/2023  1:00 PM CDT -----  Name of Who is Calling: MICHEAL PENA [[id number]]       What is the request in detail: pt stated that he US Arm (R) for  DVT he was supposed to get a call regarding this matter due to the concern that was expressed to him, please assist with this matter        Can the clinic reply by [facility]: call back but portal is fine        What Number to Call Back if not in [facility]: [phone]

## 2023-08-18 ENCOUNTER — TELEPHONE (OUTPATIENT)
Dept: HEMATOLOGY/ONCOLOGY | Facility: CLINIC | Age: 38
End: 2023-08-18

## 2023-08-18 NOTE — TELEPHONE ENCOUNTER
LVM to help patient get rescheduled with Khoobehi. Gave pt [phone] as the call back number to r/s

## 2024-10-10 ENCOUNTER — HOSPITAL ENCOUNTER (EMERGENCY)
Facility: HOSPITAL | Age: 39
Discharge: HOME | End: 2024-10-10
Attending: EMERGENCY MEDICINE
Payer: OTHER MISCELLANEOUS

## 2024-10-10 VITALS
HEIGHT: 71 IN | HEART RATE: 74 BPM | BODY MASS INDEX: 39.2 KG/M2 | OXYGEN SATURATION: 99 % | RESPIRATION RATE: 17 BRPM | WEIGHT: 280 LBS | DIASTOLIC BLOOD PRESSURE: 73 MMHG | SYSTOLIC BLOOD PRESSURE: 142 MMHG | TEMPERATURE: 99 F

## 2024-10-10 DIAGNOSIS — S82.64XA CLOSED NONDISPLACED FRACTURE OF LATERAL MALLEOLUS OF RIGHT FIBULA, INITIAL ENCOUNTER: Primary | ICD-10-CM
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
## 2024-10-10 PROCEDURE — 29515 APPLICATION SHORT LEG SPLINT: CPT | Mod: RT

## 2024-10-10 PROCEDURE — 25000003 PHARM REV CODE 250

## 2024-10-10 PROCEDURE — 99283 EMERGENCY DEPT VISIT LOW MDM: CPT | Mod: 25

## 2024-10-10 RX ORDER — HYDROCODONE BITARTRATE AND ACETAMINOPHEN 5; 325 MG/1; MG/1
1 TABLET ORAL
Status: COMPLETED | OUTPATIENT
Start: 2024-10-10 | End: 2024-10-10

## 2024-10-10 RX ORDER — HYDROCODONE BITARTRATE AND ACETAMINOPHEN 5; 325 MG/1; MG/1
1 TABLET ORAL EVERY 6 HOURS PRN
Qty: 12 TABLET | Refills: 0 | Status: SHIPPED | OUTPATIENT
Start: 2024-10-10 | End: 2024-10-13

## 2024-10-10 RX ORDER — ONDANSETRON 4 MG/1
4 TABLET, ORALLY DISINTEGRATING ORAL
Status: COMPLETED | OUTPATIENT
Start: 2024-10-10 | End: 2024-10-10

## 2024-10-10 RX ADMIN — HYDROCODONE BITARTRATE AND ACETAMINOPHEN 1 TABLET: 5; 325 TABLET ORAL at 02:10

## 2024-10-10 RX ADMIN — ONDANSETRON 4 MG: 4 TABLET, ORALLY DISINTEGRATING ORAL at 02:10

## 2024-10-10 NOTE — ED PROVIDER NOTES
Encounter Date: 10/10/2024       History     Chief Complaint   Patient presents with    Ankle Pain     Pt stepped in a whole at work and twisted his right ankle. Pt states he felt a pop and his ankle instantly started swelling.      39-year-old male past medical history of hypertension presents to the emergency department for right ankle pain.  Patient states that he was walking when he fell into a hole in the grass and felt and heard a pop.  Patient states that his ankle twisted inward.  He was unable to bear weight on the extremity immediately after the incident and has not tried since.  He denies numbness or tingling in his toes.  Reports that his pain is a 7/10.        Review of patient's allergies indicates:  No Known Allergies  No past medical history on file.  No past surgical history on file.  No family history on file.  Social History     Tobacco Use    Smoking status: Former     Types: Cigarettes    Smokeless tobacco: Current     Types: Chew     Review of Systems   Constitutional: Negative.    Respiratory: Negative.     Cardiovascular: Negative.    Musculoskeletal:  Positive for arthralgias and joint swelling.       Physical Exam     Initial Vitals [10/10/24 1252]   BP Pulse Resp Temp SpO2   (!) 162/85 85 16 98 °F (36.7 °C) 98 %      MAP       --         Physical Exam    Vitals reviewed.  Constitutional: He appears well-developed and well-nourished. He is not diaphoretic. No distress.   HENT: Mouth/Throat: Oropharynx is clear and moist.   Eyes: Conjunctivae and EOM are normal.   Neck:   Normal range of motion.  Cardiovascular:  Normal rate, regular rhythm and normal heart sounds.           Distal pulses intact   Pulmonary/Chest: Breath sounds normal.   Musculoskeletal:      Cervical back: Normal range of motion.      Comments: significant swelling over the lateral malleolus as well as tenderness.  There is no erythema, discoloration, lesions noted to the skin.  He has moderately limited range of motion due  to pain.  He is able to wiggle his toes and toe-touch weight-bearing     Neurological: He is alert and oriented to person, place, and time. He has normal strength.   Neurovascularly intact.  Perfusing well.  Gait not assessed due to nonweightbearing.  Sensation intact to light and deep   Skin: Skin is warm. Capillary refill takes less than 2 seconds.         ED Course   Procedures  Labs Reviewed - No data to display       Imaging Results              X-Ray Ankle Complete Right (Final result)  Result time 10/10/24 14:38:02      Final result by Nito Beasley MD (10/10/24 14:38:02)                   Impression:      Osseous findings as above.      Electronically signed by: Nito Beasley  Date:    10/10/2024  Time:    14:38               Narrative:    CLINICAL HISTORY:  (MRN [id number] y/o  (1985) M    Unspecified fall, initial encounter    TECHNIQUE:  (A# [id number], Exam time 10/10/2024 14:27)    [id number] XR ANKLE COMPLETE 3 VIEW RIGHT 3 view(s) obtained.    COMPARISON:  None available.    FINDINGS:  Marked focal soft tissue swelling predominantly over the lateral malleolus.  There is a small curvilinear ossific density over the tip of the lateral malleolus suspicious for an occult hairline nondisplaced arcuate fracture.  The ankle mortise and talar dome appear congruent.  No radiopaque foreign body is seen.  There is a moderate-sized plantar calcaneal bone spur.                                       X-Ray Tibia Fibula 2 View Right (Final result)  Result time 10/10/24 14:35:33      Final result by Nito Beasley MD (10/10/24 14:35:33)                   Impression:      Osseous findings as above.      Electronically signed by: Nito Beasley  Date:    10/10/2024  Time:    14:35               Narrative:    CLINICAL HISTORY:  (MRN [id number] y/o  (1985) M    Unspecified fall, initial encounter    TECHNIQUE:  (A# [id number], Exam time 10/10/2024 14:26)    [id number] XR TIBIA FIBULA 2 VIEW RIGHT 4 view(s)  obtained.    COMPARISON:  None available.    FINDINGS:  There is cloth/bandage material around the knee which partially obscures radiographic findings.  No acute displaced fracture is identified in the knee, however if there is concern dedicated radiographs can be performed for further characterization.    The knee and ankle joint appear to be congruent, and the ankle mortise/talar dome appear to be maintained.  There is marked focal soft tissue swelling over the lateral malleolus and a small crescent shaped ossific density along the tip of the lateral malleolus suggesting a small arcuate/avulsion injury.  The medial malleolus appears to be intact.  There is a moderate-sized plantar calcaneal bone spur.                                       Medications   HYDROcodone-acetaminophen 5-325 mg per tablet 1 tablet (1 tablet Oral Given 10/10/24 1408)   ondansetron disintegrating tablet 4 mg (4 mg Oral Given 10/10/24 1408)     Medical Decision Making  39-year-old male past medical history of hypertension presents to the emergency department for right ankle pain.  Patient states that he was walking when he fell into a hole in the grass and felt and heard a pop.  Patient states that his ankle twisted inward.  He was unable to bear weight on the extremity immediately after the incident and has not tried since.  He denies numbness or tingling in his toes.  Reports that his pain is a 7/10.    Considerations include but not limited to sprain, fracture, dislocation, contusion, vascular injury, abrasion    Vitals stable.  Patient afebrile.  Blurry well-appearing on physical exam.  There is moderate swelling over the right lateral malleolus.  He has significant tenderness in the area of the swelling.  I do not note any swelling above or below the joint.  Perfusing well.  Sensation is intact in his lower extremities.  Patient has painful and limited range of motion with plantar flexion and dorsiflexion.  X-ray shows hairline  nondisplaced arcuate fracture and marked focal soft tissue swelling predominantly over the lateral malleolus.  Patient was placed in a posterior short-leg splint and informed to follow up with Orthopedics.  He verbalizes understanding of the plan and agreed.  Plan also discussed with my attending and all questions were answered at the bedside.    Amount and/or Complexity of Data Reviewed  Radiology: ordered.    Risk  Prescription drug management.                                      Clinical Impression:  Final diagnoses:  [W19.XXXA] Fall  [S82.64XA] Closed nondisplaced fracture of lateral malleolus of right fibula, initial encounter (Primary)          ED Disposition Condition    Discharge Stable          ED Prescriptions       Medication Sig Dispense Start Date End Date Auth. Provider    HYDROcodone-acetaminophen (NORCO) 5-325 mg per tablet Take 1 tablet by mouth every 6 (six) hours as needed for Pain. 12 tablet 10/10/2024 10/13/2024 Josephine Kilpatrick PA-C          Follow-up Information       Follow up With Specialties Details Why Contact Info    Andie Mcarthur NP Internal Medicine Call   [address] [facility] [place] [address]  [phone]      [place], [facility] Orthopedic Surgery Call   [address]  [address]  [place] [address]  [phone]               Josephine Kilpatrick PA-C  10/10/24 1410

## 2024-10-10 NOTE — Clinical Note
"Antoine"Brian Pearson was seen and treated in our emergency department on 10/10/2024.  He may return to work on 10/12/2024.  Please excuse Antoine Pearson from work. He was given one dose of 5 mg Norco here in the emergency department for pain.  He may return to work on Monday.     If you have any questions or concerns, please don't hesitate to call.      Josephine Kilpatrick PA-C"

## 2024-10-10 NOTE — DISCHARGE INSTRUCTIONS
Please follow up with Orthopedics who is phone number I have attached to the front of your discharge paperwork.  You may rotate between ibuprofen and Tylenol for pain as well as ice.  You may bear weight as tolerated.

## 2024-10-17 ENCOUNTER — TELEPHONE (OUTPATIENT)
Dept: HEMATOLOGY/ONCOLOGY | Facility: CLINIC | Age: 39
End: 2024-10-17

## 2024-10-23 ENCOUNTER — TELEPHONE (OUTPATIENT)
Dept: HEMATOLOGY/ONCOLOGY | Facility: CLINIC | Age: 39
End: 2024-10-23

## 2024-10-24 ENCOUNTER — OFFICE VISIT (OUTPATIENT)
Dept: HEMATOLOGY/ONCOLOGY | Facility: CLINIC | Age: 39
End: 2024-10-24

## 2024-10-24 VITALS
DIASTOLIC BLOOD PRESSURE: 75 MMHG | SYSTOLIC BLOOD PRESSURE: 142 MMHG | RESPIRATION RATE: 18 BRPM | HEIGHT: 71 IN | HEART RATE: 55 BPM | OXYGEN SATURATION: 99 % | BODY MASS INDEX: 39.04 KG/M2 | TEMPERATURE: 98 F | WEIGHT: 278.88 LBS

## 2024-10-24 DIAGNOSIS — Z86.718 HISTORY OF DVT (DEEP VEIN THROMBOSIS): Primary | ICD-10-CM

## 2024-10-24 PROCEDURE — 99213 OFFICE O/P EST LOW 20 MIN: CPT | Mod: PBBFAC,PN | Performed by: INTERNAL MEDICINE

## 2024-10-24 PROCEDURE — 99999 PR PBB SHADOW E&M-EST. PATIENT-LVL III: CPT | Mod: PBBFAC,,, | Performed by: INTERNAL MEDICINE

## 2024-10-24 NOTE — PROGRESS NOTES
Service Date:  10/24/24    Chief Complaint: RUE DVT    Antoine Pearson is a 39 y.o. male here to follow-up on DVT.  Patient was on anticoagulation and I last saw him in May of 2023.  I did ultrasound of his right upper extremity at that time and it showed extensive DVT still present.  Patient did not follow up but did continue anticoagulation for 3 more months.  He states that the swelling in his arm has resolved now.  He is doing well.  He recently injured his right ankle at work and is currently in a boot.  He may or may not need surgery in the future, and is awaiting an MRI once the swelling has improved in his ankle.    Review of Systems   Constitutional: Negative.  Negative for appetite change.   HENT: Negative.  Negative for mouth sores.    Eyes: Negative.  Negative for visual disturbance.   Respiratory: Negative.  Negative for cough and shortness of breath.    Cardiovascular: Negative.  Negative for chest pain.   Gastrointestinal: Negative.  Negative for abdominal pain and diarrhea.   Endocrine: Negative.    Genitourinary: Negative.    Musculoskeletal: Negative.  Negative for back pain.   Integumentary:  Negative for rash. Negative.   Neurological: Negative.  Negative for headaches.   Hematological: Negative.  Negative for adenopathy.   Psychiatric/Behavioral: Negative.  The patient is not nervous/anxious.         Current Outpatient Medications   Medication Instructions    amLODIPine (NORVASC) 10 mg    apixaban (ELIQUIS) 5 mg, Oral, 2 times daily    lisinopriL (PRINIVIL,ZESTRIL) 40 mg    metoprolol tartrate (LOPRESSOR) 100 mg, 2 times daily    naproxen (NAPROSYN) 500 mg, Oral, 2 times daily with meals        History reviewed. No pertinent past medical history.     No past surgical history on file.     No family history on file.    Social History     Tobacco Use    Smoking status: Former     Types: Cigarettes    Smokeless tobacco: Former     Types: Chew     Quit date: 10/1/2024         Vitals:    10/24/24 1112  "  BP: (!) 142/75   Pulse: (!) 55   Resp: 18   Temp: 98.2 °F (36.8 °C)        Physical Exam:  BP (!) 142/75 (BP Location: Right arm, Patient Position: Sitting)   Pulse (!) 55   Temp 98.2 °F (36.8 °C) (Temporal)   Resp 18   Ht 5' 11" (1.803 m)   Wt 126.5 kg (278 lb 14.1 oz)   SpO2 99%   BMI 38.90 kg/m²     Physical Exam  Vitals and nursing note reviewed.   Constitutional:       Appearance: Normal appearance.   HENT:      Head: Normocephalic and atraumatic.      Nose: Nose normal.      Mouth/Throat:      Mouth: Mucous membranes are moist.      Pharynx: Oropharynx is clear.   Eyes:      Extraocular Movements: Extraocular movements intact.      Conjunctiva/sclera: Conjunctivae normal.   Cardiovascular:      Rate and Rhythm: Normal rate and regular rhythm.      Heart sounds: Normal heart sounds.   Pulmonary:      Effort: Pulmonary effort is normal.      Breath sounds: Normal breath sounds.   Abdominal:      General: Abdomen is flat. Bowel sounds are normal.      Palpations: Abdomen is soft.   Musculoskeletal:         General: Normal range of motion.      Cervical back: Normal range of motion and neck supple.   Skin:     General: Skin is warm and dry.   Neurological:      General: No focal deficit present.      Mental Status: He is alert and oriented to person, place, and time. Mental status is at baseline.   Psychiatric:         Mood and Affect: Mood normal.          Labs:  No results found for: "WBC", "RBC", "HGB", "HCT", "MCV", "MCH", "MCHC", "RDW", "PLT", "MPV", "GRAN", "LYMPH", "MONO", "EOS", "BASO", "EOSINOPHIL", "BASOPHIL"  No results found for: "NA", "K", "CL", "CO2", "GLU", "BUN", "CREATININE", "CALCIUM", "PROT", "ALBUMIN", "BILITOT", "ALKPHOS", "AST", "ALT", "ANIONGAP", "ESTGFRAFRICA", "EGFRNONAA"    A/P:    Right upper extremity DVT  -likely provoked from his shoulder surgery   -repeat ultrasound after 3 months of anticoagulation showed that he still had a DVT.  Patient did not follow up with me " afterwards, but did take 3 more months of anticoagulation and then stopped.  His right upper extremity swelling has resolved.  -I will repeat a D-dimer and ultrasound in 6 months and if okay patient can stay off anticoagulation      Aurash Khoobehi, MD  Hematology and Oncology

## 2025-04-17 ENCOUNTER — HOSPITAL ENCOUNTER (OUTPATIENT)
Dept: RADIOLOGY | Facility: HOSPITAL | Age: 40
Discharge: HOME | End: 2025-04-17
Attending: INTERNAL MEDICINE

## 2025-04-17 DIAGNOSIS — Z86.718 HISTORY OF DVT (DEEP VEIN THROMBOSIS): ICD-10-CM

## 2025-04-17 PROCEDURE — 93971 EXTREMITY STUDY: CPT | Mod: TC,PO,RT

## 2025-04-24 ENCOUNTER — OFFICE VISIT (OUTPATIENT)
Dept: HEMATOLOGY/ONCOLOGY | Facility: CLINIC | Age: 40
End: 2025-04-24

## 2025-04-24 VITALS
HEIGHT: 71 IN | WEIGHT: 286.38 LBS | RESPIRATION RATE: 18 BRPM | SYSTOLIC BLOOD PRESSURE: 131 MMHG | OXYGEN SATURATION: 97 % | BODY MASS INDEX: 40.09 KG/M2 | HEART RATE: 58 BPM | TEMPERATURE: 98 F | DIASTOLIC BLOOD PRESSURE: 68 MMHG

## 2025-04-24 DIAGNOSIS — I82.721 CHRONIC DEEP VEIN THROMBOSIS (DVT) OF BRACHIAL VEIN OF RIGHT UPPER EXTREMITY: Primary | ICD-10-CM

## 2025-04-24 PROCEDURE — 99213 OFFICE O/P EST LOW 20 MIN: CPT | Mod: PBBFAC,PN | Performed by: INTERNAL MEDICINE

## 2025-04-24 PROCEDURE — 99999 PR PBB SHADOW E&M-EST. PATIENT-LVL III: CPT | Mod: PBBFAC,,, | Performed by: INTERNAL MEDICINE

## 2025-04-24 NOTE — PROGRESS NOTES
"Service Date:  4/24/25    Chief Complaint: RUE DVT    Antoine Pearson is a 40 y.o. male here to follow-up on DVT.  Doing okay.  No longer on anticoagulation.  Ankle has healed up as well.  Recently had an ultrasound which shows a chronic brachial vein clot.  This is considered a deep vein.    Review of Systems   Constitutional: Negative.  Negative for appetite change.   HENT: Negative.  Negative for mouth sores.    Eyes: Negative.  Negative for visual disturbance.   Respiratory: Negative.  Negative for cough and shortness of breath.    Cardiovascular: Negative.  Negative for chest pain.   Gastrointestinal: Negative.  Negative for abdominal pain and diarrhea.   Endocrine: Negative.    Genitourinary: Negative.  Negative for frequency.   Musculoskeletal: Negative.  Negative for back pain.   Integumentary:  Negative for rash. Negative.   Neurological: Negative.  Negative for headaches.   Hematological: Negative.  Negative for adenopathy.   Psychiatric/Behavioral: Negative.  The patient is not nervous/anxious.         Current Outpatient Medications   Medication Instructions    amLODIPine (NORVASC) 10 mg    apixaban (ELIQUIS) 5 mg, Oral, 2 times daily    lisinopriL (PRINIVIL,ZESTRIL) 40 mg    metoprolol tartrate (LOPRESSOR) 100 mg, 2 times daily    naproxen (NAPROSYN) 500 mg, Oral, 2 times daily with meals        No past medical history on file.     No past surgical history on file.     No family history on file.    Social History     Tobacco Use    Smoking status: Former     Types: Cigarettes    Smokeless tobacco: Former     Types: Chew     Quit date: 10/1/2024         Vitals:    04/24/25 1408   BP: 131/68   Pulse: (!) 58   Resp: 18   Temp: 98.1 °F (36.7 °C)        Physical Exam:  /68 (BP Location: Right arm, Patient Position: Sitting)   Pulse (!) 58   Temp 98.1 °F (36.7 °C) (Temporal)   Resp 18   Ht 5' 11" (1.803 m)   Wt 129.9 kg (286 lb 6 oz)   SpO2 97%   BMI 39.94 kg/m²     Physical Exam  Vitals and " "nursing note reviewed.   Constitutional:       Appearance: Normal appearance.   HENT:      Head: Normocephalic and atraumatic.      Nose: Nose normal.      Mouth/Throat:      Mouth: Mucous membranes are moist.      Pharynx: Oropharynx is clear.   Eyes:      Extraocular Movements: Extraocular movements intact.      Conjunctiva/sclera: Conjunctivae normal.   Cardiovascular:      Rate and Rhythm: Normal rate and regular rhythm.      Heart sounds: Normal heart sounds.   Pulmonary:      Effort: Pulmonary effort is normal.      Breath sounds: Normal breath sounds.   Abdominal:      General: Abdomen is flat. Bowel sounds are normal.      Palpations: Abdomen is soft.   Musculoskeletal:         General: Normal range of motion.      Cervical back: Normal range of motion and neck supple.   Skin:     General: Skin is warm and dry.   Neurological:      General: No focal deficit present.      Mental Status: He is alert and oriented to person, place, and time. Mental status is at baseline.   Psychiatric:         Mood and Affect: Mood normal.          Labs:  No results found for: "WBC", "RBC", "HGB", "HCT", "MCV", "MCH", "MCHC", "RDW", "PLT", "MPV", "GRAN", "LYMPH", "MONO", "EOS", "BASO", "EOSINOPHIL", "BASOPHIL"  No results found for: "NA", "K", "CL", "CO2", "GLU", "BUN", "CREATININE", "CALCIUM", "PROT", "ALBUMIN", "BILITOT", "ALKPHOS", "AST", "ALT", "ANIONGAP", "ESTGFRAFRICA", "EGFRNONAA"    A/P:    Right upper extremity DVT  -likely provoked from his shoulder surgery   -at this point it is a chronic DVT.  He has been off anticoagulation for over a year.  His D-dimer is within normal limits.  It is okay for him to stay off anticoagulation knowing that he is a male under 50 which puts him at about 6.3% risk of DVT based on dash score, although this maybe lower as it was a provoked clot.  -he is okay with this plan.  Return to clinic if needed if he develops another clot or is having any type of procedure again.      Aurash " Khoobehi, MD  Hematology and Oncology